# Patient Record
Sex: MALE | URBAN - METROPOLITAN AREA
[De-identification: names, ages, dates, MRNs, and addresses within clinical notes are randomized per-mention and may not be internally consistent; named-entity substitution may affect disease eponyms.]

---

## 2024-07-30 ENCOUNTER — OFFICE VISIT (OUTPATIENT)
Dept: PODIATRY | Facility: CLINIC | Age: 34
End: 2024-07-30

## 2024-07-30 VITALS
BODY MASS INDEX: 32.7 KG/M2 | DIASTOLIC BLOOD PRESSURE: 80 MMHG | WEIGHT: 263 LBS | TEMPERATURE: 97.3 F | HEART RATE: 59 BPM | HEIGHT: 75 IN | SYSTOLIC BLOOD PRESSURE: 121 MMHG | OXYGEN SATURATION: 98 %

## 2024-07-30 DIAGNOSIS — M79.675 PAIN IN TOE OF LEFT FOOT: ICD-10-CM

## 2024-07-30 DIAGNOSIS — L02.612 ABSCESS OF TOE OF LEFT FOOT: Primary | ICD-10-CM

## 2024-07-30 DIAGNOSIS — L60.0 INGROWN TOENAIL: ICD-10-CM

## 2024-07-30 PROCEDURE — 99213 OFFICE O/P EST LOW 20 MIN: CPT | Performed by: PODIATRIST

## 2024-07-30 PROCEDURE — 11765 WEDGE EXCISION SKN NAIL FOLD: CPT | Mod: TA | Performed by: PODIATRIST

## 2024-07-30 RX ORDER — MUPIROCIN CALCIUM 20 MG/G
CREAM TOPICAL DAILY
Qty: 15 G | Refills: 0 | Status: SHIPPED | OUTPATIENT
Start: 2024-07-30 | End: 2024-08-13

## 2024-07-30 RX ORDER — SULFAMETHOXAZOLE AND TRIMETHOPRIM 800; 160 MG/1; MG/1
1 TABLET ORAL 2 TIMES DAILY
COMMUNITY
Start: 2024-07-29

## 2024-07-30 ASSESSMENT — PATIENT HEALTH QUESTIONNAIRE - PHQ9
1. LITTLE INTEREST OR PLEASURE IN DOING THINGS: NOT AT ALL
2. FEELING DOWN, DEPRESSED OR HOPELESS: NOT AT ALL
SUM OF ALL RESPONSES TO PHQ9 QUESTIONS 1 AND 2: 0

## 2024-08-07 NOTE — PROGRESS NOTES
Initial Podiatric Office Visit:    Chief Complaint:   Chief Complaint   Patient presents with    Toe Pain     Left great tor pain possible ingrown toenail/cellulitis           HPI:  This 33 y.o. male with PMH indicated below presents complaining of left hallux medial nail border pain with cellulitis.  Patient states that a few days ago he bumped his great toe on the couch and noticed increased pain and swelling of the medial left hallux nail.  He states that the pain and swelling increased and he noticed redness with scant purulent drainage.  Patient went to the urgent care where he was placed on oral antibiotics.  Patient states that since being on antibiotics the swelling and redness has gone down however there is some pinching discomfort still there.  Today patient rates his pain a 4 out of 10 on the pain scale.  He has not tried to debride the nail himself.  He denies any constitutional symptoms at this time.  No other pedal complaints.      PCP:  No primary care provider on file.:    [facility]  No past medical history on file.:    MEDICATIONS  Current Outpatient Medications   Medication Sig Dispense Refill    sulfamethoxazole-trimethoprim (Bactrim DS) 800-160 mg tablet Take 1 tablet by mouth 2 times a day.      mupirocin (Bactroban) 2 % cream Apply topically once daily for 14 days. 15 g 0     No current facility-administered medications for this visit.   :  No Known Allergies:  No past surgical history on file.  No family history on file.:  Social History     Socioeconomic History    Marital status: Single   Tobacco Use    Smoking status: Never    Smokeless tobacco: Never   Substance and Sexual Activity    Alcohol use: Yes     Alcohol/week: 5.0 - 20.0 standard drinks of alcohol     Types: 5 - 20 Standard drinks or equivalent per week    Drug use: Yes     Types: Marijuana       REVIEW OF SYSTEMS    GENERAL: No weight loss, malaise or fevers.  HEENT: Negative for frequent or significant headaches,   RESPIRATORY:  Negative for cough, wheezing or shortness of breath.  CARDIOVASCULAR: Negative for chest pain, leg swelling or palpitations.  GI: Negative for abdominal discomfort,  MUSCULOSKELETAL: Denies back pain, left hallux pain  SKIN: Negative for lesions, rash, and itching.  NEURO: Denies numbess, tingling or burning in feet      Physical Exam:       Patient is alert and oriented x 3 in NAD    Vascular:   2/4 palpable Dorsalis Pedis and Posterior Tibial Pulses B/L  Capillary Fill time < 3 seconds to digits 1-5 B/L  Skin temperature warm to warm tibial tuberosity to the digits B/L  Mild edema to the left medial hallux.   No varicosities    Neurological:   Intact light touch/epicritic sensation B/L  Intact sharp/dull sensations  Intact protective sensation, no clonus b/l.  No focal neurological deficits    Dermatological:   Skin appears well hydrated and supple. good color, texture, turgor. No open lesions present. Hyperkeratosis not noted. Webspaces clean and dry 1-4 b/l. Nails 1-5 b/l appear normal in thickness, length, discoloration with evidence of incurvation to the left hallux medial nail border.  No purulence expressed.  Mild thickening of the left medial hallux nail border skin.    Musculoskeletal/Orthopaedic:     +5/5 muscle strength Dorsiflexion, Plantarflexion, Inversion, Eversion B/L  ROM of the 1st MTPJ is decreased without pain or crepitus b/l.   Tenderness to palpation of the left hallux medial nail border upon compression of the nailbed.  ROM of the MTJ/STJ is full without pain or crepitus b/l.  Ankle joint ROM is decreased  B/L        ASSESSMENT:     1. Abscess of toe of left foot    2. Pain in toe of left foot    3. Ingrown toenail          Plan:    - Initial Office Visit   - Etiology and treatment options were discussed with the patient.  -Patient examined and evaluated  - Discussed with patient that the trauma to the couch may have caused the left hallux nail to become ingrown and digging into the skin  causing a superficial abscess infection.  - Discussed with patient performing slant back procedure to alleviate incurvated nail.  Patient agrees to this.  A slant back procedure with removal of the incurvated nail as well as medial nail fold wedge of tissue performed with topical anesthetic.  Patient given cleansing instructions and daily dressing changes with mupirocin to be performed over the next 3 to 4 days.  - Prescription for mupirocin sent to pharmacy.  - Discussed with patient adequate nail care.  - Patient to finish oral antibiotics that were given at the urgent care.  - Patient to follow-up as needed    Juana Gray DPM      A total of 30 minutes was spent in formulation of this note, review of charts, medications, treatment, coordination of care with a minimum of 50% of the time spent in face to face with with the patient.  All questions and concerns were answered to the patients satisfaction.       Off note, use of vocal Dragon dictation system was used to dictate this document.  All proper spelling and grammatical errors may not have been corrected prior to final submission.